# Patient Record
Sex: FEMALE | Race: WHITE | NOT HISPANIC OR LATINO | Employment: FULL TIME | URBAN - METROPOLITAN AREA
[De-identification: names, ages, dates, MRNs, and addresses within clinical notes are randomized per-mention and may not be internally consistent; named-entity substitution may affect disease eponyms.]

---

## 2023-11-20 ENCOUNTER — HOSPITAL ENCOUNTER (EMERGENCY)
Facility: HOSPITAL | Age: 33
Discharge: HOME | End: 2023-11-20
Attending: EMERGENCY MEDICINE | Admitting: EMERGENCY MEDICINE
Payer: OTHER GOVERNMENT

## 2023-11-20 VITALS
WEIGHT: 125 LBS | OXYGEN SATURATION: 99 % | DIASTOLIC BLOOD PRESSURE: 80 MMHG | RESPIRATION RATE: 16 BRPM | HEIGHT: 63 IN | HEART RATE: 69 BPM | TEMPERATURE: 97.8 F | BODY MASS INDEX: 22.15 KG/M2 | SYSTOLIC BLOOD PRESSURE: 115 MMHG

## 2023-11-20 DIAGNOSIS — H10.13 ALLERGIC CONJUNCTIVITIS OF BOTH EYES: Primary | ICD-10-CM

## 2023-11-20 PROCEDURE — 99282 EMERGENCY DEPT VISIT SF MDM: CPT

## 2023-11-20 RX ORDER — LEVOTHYROXINE SODIUM 0.1 MG/1
100 TABLET ORAL DAILY
COMMUNITY

## 2023-11-20 RX ORDER — CETIRIZINE HYDROCHLORIDE 10 MG/1
10 TABLET ORAL DAILY PRN
COMMUNITY

## 2023-11-20 RX ORDER — KETOTIFEN FUMARATE 0.35 MG/ML
1 SOLUTION/ DROPS OPHTHALMIC 2 TIMES DAILY
Qty: 10 ML | Refills: 0 | Status: SHIPPED | OUTPATIENT
Start: 2023-11-20

## 2023-11-21 NOTE — ED NOTES
Bilateral periorbital swelling and redness s/p using unknown OTC allergy eye drops. Denies vision changes.

## 2023-11-21 NOTE — ED PROVIDER NOTES
MD ATTESTATION NOTE    The CHUNG and I have discussed this patient's history, physical exam, and treatment plan.  I have reviewed the documentation and personally had a face to face interaction with the patient. I affirm the documentation and agree with the treatment and plan.  The attached note describes my personal findings.    I provided a substantive portion of the care of this patient. I personally performed the physical exam, in its entirety.    Independent Historians: Patient, family    A complete HPI/ROS/PMH/PSH/SH/FH are unobtainable due to: Nothing    Chronic or social conditions impacting patient care (social determinants of health): None    Elena Hughes is a 33 y.o. female who presents to the ED c/o acute misshapened eyes.  The patient reports that she was afraid that her left eye was becoming misshapen.  She states that she has had some itching for the last week in both of her eyes.  She reports today she looked in her left eye and thought that it was misshapen.  She was afraid it was turning into a cone shape.  She denies any vision change.  She denies any pain.  She does not wear contacts or glasses.  She does report using some eye cream.      Review of prior external notes (non-ED) -and- Review of prior external test results outside of this encounter: Extensive review of the EPIC system as well as [facility] reveals no prior visit notes and no prior diagnostic studies available for review.    Prescription drug monitoring program review:        On exam:  GENERAL: Awake, alert, no acute distress  SKIN: Warm, dry  HENT: Normocephalic, atraumatic  EYES: no scleral icterus.  Bilateral eyes are of normal shape.  There is some mild bilateral injection and mild conjunctivitis.  Her extraocular movements are intact.  Her pupils are PERRL.  CV: regular rhythm, regular rate  RESPIRATORY: normal effort, lungs clear  ABDOMEN: soft, nontender, nondistended  MUSCULOSKELETAL: no deformity  NEURO: alert, moves  all extremities, follows commands                                                             Labs  No results found for this or any previous visit (from the past 24 hour(s)).    Radiology  No Radiology Exams Resulted Within Past 24 Hours    Medical Decision Making:  ED Course as of 11/20/23 2227 Mon Nov 20, 2023 2136 Visual acuity: Right 20/20, left 20/15 [ERON]      ED Course User Index  [ERON] Willian Duran PA       Clinical Scores:              I do see some conjunctivitis in her eyes but no abnormal shapes.  She has normal extraocular movements and normal pupillary function.  She has no vision change.  I see no indication for further work-up.  My differential diagnosis includes conjunctivitis, allergic conjunctivitis, infectious conjunctivitis, worried well, and others.    Procedures:  Procedures                  Diagnosis  Final diagnoses:   Allergic conjunctivitis of both eyes       Note Disclaimer: At [facility], we believe that sharing information builds trust and better relationships. You are receiving this note because you recently visited [facility]. It is possible you will see health information before a provider has talked with you about it. This kind of information can be easy to misunderstand. To help you fully understand what it means for your health, we urge you to discuss this note with your provider.       Polo Farmer MD  11/20/23 2227

## 2023-11-21 NOTE — ED PROVIDER NOTES
EMERGENCY DEPARTMENT ENCOUNTER    Room Number:  25/25  Date of encounter:  11/20/2023  PCP: System, Provider Not In  Patient Care Team:  System, Provider Not In as PCP - General   Independent Historians: Patient    HPI:  Chief Complaint: Eye itching  A complete HPI/ROS/PMH/PSH/SH/FH are unobtainable due to: N/A    Chronic or social conditions impacting patient care (social determinants of health): None    Context: Elena Hughes is a 33 y.o. female with past medical history of allergies and hypothyroidism after ablation who arrives to the ED with complaint of eye itching.  Patient states that both of her eyes have been itching for the past week with the left being worse than the right.  Patient states that eyes have been watering but no purulent drainage, no vision changes, no loss of vision, and is now concerned of abnormal swelling to the left eye.  Denies any injury or trauma, no fever or chills, or headaches.  Patient states that she had her thyroid studies checked approximately 3 months ago and her numbers were within normal limits.    Review of prior external notes (non-ED): None    Review of prior external test results outside of this encounter: None    PAST MEDICAL HISTORY  Active Ambulatory Problems     Diagnosis Date Noted    No Active Ambulatory Problems     Resolved Ambulatory Problems     Diagnosis Date Noted    No Resolved Ambulatory Problems     No Additional Past Medical History       The patient qualifies to receive the vaccine, but they have not yet received it.    PAST SURGICAL HISTORY  No past surgical history on file.      FAMILY HISTORY  No family history on file.      SOCIAL HISTORY  Social History     Socioeconomic History    Marital status:          ALLERGIES  Patient has no known allergies.        REVIEW OF SYSTEMS  Review of Systems     All systems reviewed and negative except for those discussed in HPI.       PHYSICAL EXAM    I have reviewed the triage vital signs and nursing  notes.    ED Triage Vitals   Temp Heart Rate Resp BP SpO2   11/20/23 2016 11/20/23 2016 11/20/23 2016 11/20/23 2025 11/20/23 2016   97.8 °F (36.6 °C) 85 20 132/86 98 %      Temp src Heart Rate Source Patient Position BP Location FiO2 (%)   -- 11/20/23 2025 11/20/23 2025 11/20/23 2025 --    Monitor Sitting Left arm        Physical Exam    GENERAL: alert and oriented x4, anxious, but not distressed  HENT: normocephalic, atraumatic, no pharyngeal erythema or tonsillar exudate, TMs clear  EYES: no scleral icterus, PERRL, EOMI, mild bilateral scleral injection, left greater than right, with some mild swelling to the left upper and lower eyelids  CV: regular rhythm, regular rate, no murmurs, rubs, or gallops  RESPIRATORY: normal effort, CTAB  MUSCULOSKELETAL: no deformity  NEURO: alert, moves all extremities, no focal neuro deficits, follows commands  SKIN: warm, dry, no rash   Psych: Appropriate mood and affect      Nursing notes and vital signs reviewed      LAB RESULTS  No results found for this or any previous visit (from the past 24 hour(s)).    Ordered the above labs and independently reviewed and interpreted the results by me.        RADIOLOGY  No Radiology Exams Resulted Within Past 24 Hours    I ordered the above noted radiological studies.  These were independently interpreted and reviewed by me.  See dictation for official radiology interpretation.      PROCEDURES    Procedures      MEDICATIONS GIVEN IN ER    Medications - No data to display      PROGRESS, DATA ANALYSIS, CONSULTS, AND MEDICAL DECISION MAKING    All labs have been independently reviewed by me.  All radiology studies have been reviewed by me and discussed with radiologist dictating the report.   EKG's independently viewed and interpreted by me.  Discussion below represents my analysis of pertinent findings related to patient's condition, differential diagnosis, treatment plan and final disposition.    DDx:  Includes, but is not limited to  exophthalmos, allergic conjunctivitis,      ED Course as of 11/20/23 2200   Mon Nov 20, 2023 2136 Visual acuity: Right 20/20, left 20/15 [ERON]      ED Course User Index  [ERON] Willian Duran PA       MDM: Prescribe anti-inflammatory eyedrops, recommend over-the-counter antihistamines and follow-up with ophthalmology.  Vital signs are stable, patient is safe for discharge home.    PPE:  The patient wore a mask and I wore a mask and all appropriate PPE throughout the entire patient encounter.      AS OF 22:00 EST VITALS:    BP - 115/80  HR - 69  TEMP - 97.8 °F (36.6 °C)  O2 SATS - 99%      DIAGNOSIS  Final diagnoses:   Allergic conjunctivitis of both eyes         DISPOSITION  DISCHARGE    Patient discharged in stable condition.    Reviewed implications of results, diagnosis, meds, responsibility to follow up, warning signs and symptoms of possible worsening, potential complications and reasons to return to ER.    Patient/Family voiced understanding of above instructions.    Discussed plan for discharge, as there is no emergent indication for admission. Patient referred to primary care provider for BP management due to today's BP. Pt/family is agreeable and understands need for follow up and repeat testing.  Pt is aware that discharge does not mean that nothing is wrong but it indicates no emergency is present that requires admission and they must continue care with follow-up as given below or physician of their choice.     FOLLOW-UP  Jose Alberto Bear MD  [address] [place] [id number]  [phone]    Schedule an appointment as soon as possible for a visit            Medication List        New Prescriptions      Ketotifen Fumarate 0.035 % solution  Commonly known as: ZADITOR  Administer 1 drop to both eyes 2 (Two) Times a Day.               Where to Get Your Medications        You can get these medications from any pharmacy    Bring a paper prescription for each of these medications  Ketotifen  Fumarate 0.035 % solution           Note Disclaimer: At [facility], we believe that sharing information builds trust and better relationships. You are receiving this note because you recently visited [facility]. It is possible you will see health information before a provider has talked with you about it. This kind of information can be easy to misunderstand. To help you fully understand what it means for your health, we urge you to discuss this note with your provider.       Willian Duran PA  11/20/23 1511